# Patient Record
Sex: FEMALE | Race: WHITE | NOT HISPANIC OR LATINO | Employment: OTHER | URBAN - METROPOLITAN AREA
[De-identification: names, ages, dates, MRNs, and addresses within clinical notes are randomized per-mention and may not be internally consistent; named-entity substitution may affect disease eponyms.]

---

## 2017-01-24 ENCOUNTER — CLINIC PROCEDURE ONLY (OUTPATIENT)
Dept: URBAN - METROPOLITAN AREA CLINIC 26 | Facility: CLINIC | Age: 77
End: 2017-01-24

## 2017-01-24 DIAGNOSIS — H35.3211: ICD-10-CM

## 2017-01-24 PROCEDURE — 67028 INJECTION EYE DRUG: CPT

## 2017-01-24 ASSESSMENT — VISUAL ACUITY: OD_CC: 20/60

## 2017-01-24 ASSESSMENT — TONOMETRY: OD_IOP_MMHG: 13

## 2017-01-31 ENCOUNTER — IMPORTED ENCOUNTER (OUTPATIENT)
Dept: URBAN - METROPOLITAN AREA CLINIC 31 | Facility: CLINIC | Age: 77
End: 2017-01-31

## 2017-01-31 PROBLEM — H35.3122: Noted: 2017-01-31

## 2017-01-31 PROBLEM — H35.3211: Noted: 2017-01-31

## 2017-01-31 PROBLEM — H25.13: Noted: 2017-01-31

## 2017-01-31 PROBLEM — H35.3212: Noted: 2017-01-31

## 2017-01-31 PROBLEM — H40.1132: Noted: 2017-01-31

## 2017-01-31 PROCEDURE — 92310 CONTACT LENS FITTING OU: CPT

## 2017-01-31 PROCEDURE — 92133 CPTRZD OPH DX IMG PST SGM ON: CPT

## 2017-01-31 PROCEDURE — 92014 COMPRE OPH EXAM EST PT 1/>: CPT

## 2017-02-16 ENCOUNTER — IMPORTED ENCOUNTER (OUTPATIENT)
Dept: URBAN - METROPOLITAN AREA CLINIC 31 | Facility: CLINIC | Age: 77
End: 2017-02-16

## 2017-03-07 ENCOUNTER — FOLLOW UP (OUTPATIENT)
Dept: URBAN - METROPOLITAN AREA CLINIC 26 | Facility: CLINIC | Age: 77
End: 2017-03-07

## 2017-03-07 VITALS
WEIGHT: 162 LBS | BODY MASS INDEX: 28.7 KG/M2 | HEART RATE: 52 BPM | SYSTOLIC BLOOD PRESSURE: 108 MMHG | HEIGHT: 63 IN | DIASTOLIC BLOOD PRESSURE: 80 MMHG

## 2017-03-07 DIAGNOSIS — H01.003: ICD-10-CM

## 2017-03-07 DIAGNOSIS — H01.006: ICD-10-CM

## 2017-03-07 DIAGNOSIS — H02.833: ICD-10-CM

## 2017-03-07 DIAGNOSIS — H40.1210: ICD-10-CM

## 2017-03-07 DIAGNOSIS — H02.836: ICD-10-CM

## 2017-03-07 DIAGNOSIS — H35.3122: ICD-10-CM

## 2017-03-07 DIAGNOSIS — H47.391: ICD-10-CM

## 2017-03-07 DIAGNOSIS — H43.393: ICD-10-CM

## 2017-03-07 DIAGNOSIS — H35.3211: ICD-10-CM

## 2017-03-07 DIAGNOSIS — H25.13: ICD-10-CM

## 2017-03-07 DIAGNOSIS — H35.61: ICD-10-CM

## 2017-03-07 DIAGNOSIS — H04.123: ICD-10-CM

## 2017-03-07 PROCEDURE — G8417 CALC BMI ABV UP PARAM F/U: HCPCS

## 2017-03-07 PROCEDURE — 92014 COMPRE OPH EXAM EST PT 1/>: CPT

## 2017-03-07 PROCEDURE — 4177F TALK PT/CRGVR RE AREDS PREV: CPT

## 2017-03-07 PROCEDURE — 2027F OPTIC NERVE HEAD EVAL DONE: CPT

## 2017-03-07 PROCEDURE — 92250 FUNDUS PHOTOGRAPHY W/I&R: CPT

## 2017-03-07 PROCEDURE — 2019F DILATED MACUL EXAM DONE: CPT

## 2017-03-07 PROCEDURE — 67028 INJECTION EYE DRUG: CPT

## 2017-03-07 PROCEDURE — 1036F TOBACCO NON-USER: CPT

## 2017-03-07 ASSESSMENT — TONOMETRY
OS_IOP_MMHG: 11
OD_IOP_MMHG: 14

## 2017-03-07 ASSESSMENT — VISUAL ACUITY
OD_CC: 20/50-2
OS_CC: 20/30+1

## 2017-04-11 ENCOUNTER — CLINIC PROCEDURE ONLY (OUTPATIENT)
Dept: URBAN - METROPOLITAN AREA CLINIC 26 | Facility: CLINIC | Age: 77
End: 2017-04-11

## 2017-04-11 DIAGNOSIS — H35.3211: ICD-10-CM

## 2017-04-11 PROCEDURE — 67028 INJECTION EYE DRUG: CPT

## 2017-04-11 ASSESSMENT — TONOMETRY
OD_IOP_MMHG: 12
OS_IOP_MMHG: 11

## 2017-04-11 ASSESSMENT — VISUAL ACUITY
OS_CC: 20/30-2
OD_CC: 20/50-1

## 2017-05-16 ENCOUNTER — CLINIC PROCEDURE ONLY (OUTPATIENT)
Dept: URBAN - METROPOLITAN AREA CLINIC 26 | Facility: CLINIC | Age: 77
End: 2017-05-16

## 2017-05-16 DIAGNOSIS — H35.3211: ICD-10-CM

## 2017-05-16 PROCEDURE — 67028 INJECTION EYE DRUG: CPT

## 2017-05-16 ASSESSMENT — TONOMETRY: OD_IOP_MMHG: 11

## 2017-05-16 ASSESSMENT — VISUAL ACUITY: OD_CC: 20/60-2

## 2017-06-20 ENCOUNTER — FOLLOW UP AND POST INJECTION EVALUATION (OUTPATIENT)
Dept: URBAN - METROPOLITAN AREA CLINIC 26 | Facility: CLINIC | Age: 77
End: 2017-06-20

## 2017-06-20 VITALS — HEIGHT: 55 IN | SYSTOLIC BLOOD PRESSURE: 138 MMHG | DIASTOLIC BLOOD PRESSURE: 86 MMHG | HEART RATE: 60 BPM

## 2017-06-20 DIAGNOSIS — H47.391: ICD-10-CM

## 2017-06-20 DIAGNOSIS — H35.3211: ICD-10-CM

## 2017-06-20 DIAGNOSIS — H40.1210: ICD-10-CM

## 2017-06-20 DIAGNOSIS — H43.393: ICD-10-CM

## 2017-06-20 DIAGNOSIS — H35.3122: ICD-10-CM

## 2017-06-20 DIAGNOSIS — H35.61: ICD-10-CM

## 2017-06-20 PROCEDURE — 2019F DILATED MACUL EXAM DONE: CPT

## 2017-06-20 PROCEDURE — 67028 INJECTION EYE DRUG: CPT

## 2017-06-20 PROCEDURE — 92250 FUNDUS PHOTOGRAPHY W/I&R: CPT

## 2017-06-20 PROCEDURE — 2027F OPTIC NERVE HEAD EVAL DONE: CPT

## 2017-06-20 PROCEDURE — G8427 DOCREV CUR MEDS BY ELIG CLIN: HCPCS

## 2017-06-20 PROCEDURE — 1036F TOBACCO NON-USER: CPT

## 2017-06-20 PROCEDURE — 92134 CPTRZ OPH DX IMG PST SGM RTA: CPT

## 2017-06-20 PROCEDURE — 4177F TALK PT/CRGVR RE AREDS PREV: CPT

## 2017-06-20 PROCEDURE — 92014 COMPRE OPH EXAM EST PT 1/>: CPT

## 2017-06-20 ASSESSMENT — VISUAL ACUITY
OS_CC: 20/25+1
OD_CC: 20/40+1

## 2017-06-20 ASSESSMENT — TONOMETRY
OD_IOP_MMHG: 11
OS_IOP_MMHG: 13

## 2017-07-25 ENCOUNTER — CLINIC PROCEDURE ONLY (OUTPATIENT)
Dept: URBAN - METROPOLITAN AREA CLINIC 26 | Facility: CLINIC | Age: 77
End: 2017-07-25

## 2017-07-25 DIAGNOSIS — H35.3211: ICD-10-CM

## 2017-07-25 PROCEDURE — 67028 INJECTION EYE DRUG: CPT

## 2017-07-25 ASSESSMENT — VISUAL ACUITY: OD_SC: 20/40-

## 2017-07-25 ASSESSMENT — TONOMETRY: OD_IOP_MMHG: 13

## 2017-09-05 ENCOUNTER — CLINIC PROCEDURE ONLY (OUTPATIENT)
Dept: URBAN - METROPOLITAN AREA CLINIC 26 | Facility: CLINIC | Age: 77
End: 2017-09-05

## 2017-09-05 DIAGNOSIS — H35.3211: ICD-10-CM

## 2017-09-05 PROCEDURE — 67028 INJECTION EYE DRUG: CPT

## 2017-09-05 ASSESSMENT — TONOMETRY: OD_IOP_MMHG: 11

## 2017-09-05 ASSESSMENT — VISUAL ACUITY: OD_CC: 20/40-

## 2017-10-11 ENCOUNTER — FOLLOW UP AND POST INJECTION EVALUATION (OUTPATIENT)
Dept: URBAN - METROPOLITAN AREA CLINIC 26 | Facility: CLINIC | Age: 77
End: 2017-10-11

## 2017-10-11 VITALS — HEART RATE: 53 BPM | DIASTOLIC BLOOD PRESSURE: 84 MMHG | HEIGHT: 55 IN | SYSTOLIC BLOOD PRESSURE: 128 MMHG

## 2017-10-11 DIAGNOSIS — H40.1210: ICD-10-CM

## 2017-10-11 DIAGNOSIS — H40.033: ICD-10-CM

## 2017-10-11 DIAGNOSIS — H43.393: ICD-10-CM

## 2017-10-11 DIAGNOSIS — H35.61: ICD-10-CM

## 2017-10-11 DIAGNOSIS — H47.391: ICD-10-CM

## 2017-10-11 DIAGNOSIS — H35.3211: ICD-10-CM

## 2017-10-11 DIAGNOSIS — H35.3122: ICD-10-CM

## 2017-10-11 PROCEDURE — 2019F DILATED MACUL EXAM DONE: CPT

## 2017-10-11 PROCEDURE — 4177F TALK PT/CRGVR RE AREDS PREV: CPT

## 2017-10-11 PROCEDURE — G8427 DOCREV CUR MEDS BY ELIG CLIN: HCPCS

## 2017-10-11 PROCEDURE — 67028 INJECTION EYE DRUG: CPT

## 2017-10-11 PROCEDURE — 92134 CPTRZ OPH DX IMG PST SGM RTA: CPT

## 2017-10-11 PROCEDURE — 1036F TOBACCO NON-USER: CPT

## 2017-10-11 PROCEDURE — 92012 INTRM OPH EXAM EST PATIENT: CPT

## 2017-10-11 ASSESSMENT — VISUAL ACUITY
OD_CC: 20/40
OS_CC: 20/25

## 2017-10-11 ASSESSMENT — TONOMETRY
OS_IOP_MMHG: 12
OD_IOP_MMHG: 12

## 2017-11-15 ENCOUNTER — FOLLOW UP (OUTPATIENT)
Dept: URBAN - METROPOLITAN AREA CLINIC 26 | Facility: CLINIC | Age: 77
End: 2017-11-15

## 2017-11-15 VITALS — SYSTOLIC BLOOD PRESSURE: 120 MMHG | HEART RATE: 68 BPM | HEIGHT: 55 IN | DIASTOLIC BLOOD PRESSURE: 82 MMHG

## 2017-11-15 DIAGNOSIS — H47.391: ICD-10-CM

## 2017-11-15 DIAGNOSIS — H43.393: ICD-10-CM

## 2017-11-15 DIAGNOSIS — H35.3122: ICD-10-CM

## 2017-11-15 DIAGNOSIS — H40.033: ICD-10-CM

## 2017-11-15 DIAGNOSIS — H35.3211: ICD-10-CM

## 2017-11-15 DIAGNOSIS — H40.1210: ICD-10-CM

## 2017-11-15 DIAGNOSIS — H35.61: ICD-10-CM

## 2017-11-15 PROCEDURE — 1036F TOBACCO NON-USER: CPT

## 2017-11-15 PROCEDURE — 4177F TALK PT/CRGVR RE AREDS PREV: CPT

## 2017-11-15 PROCEDURE — 92014 COMPRE OPH EXAM EST PT 1/>: CPT

## 2017-11-15 PROCEDURE — 67028 INJECTION EYE DRUG: CPT

## 2017-11-15 PROCEDURE — 2019F DILATED MACUL EXAM DONE: CPT

## 2017-11-15 PROCEDURE — G8427 DOCREV CUR MEDS BY ELIG CLIN: HCPCS

## 2017-11-15 PROCEDURE — 92250 FUNDUS PHOTOGRAPHY W/I&R: CPT

## 2017-11-15 ASSESSMENT — VISUAL ACUITY
OD_CC: 20/25-2
OS_CC: 20/30

## 2017-11-15 ASSESSMENT — TONOMETRY
OD_IOP_MMHG: 14
OS_IOP_MMHG: 15

## 2017-12-20 ENCOUNTER — CLINIC PROCEDURE ONLY (OUTPATIENT)
Dept: URBAN - METROPOLITAN AREA CLINIC 26 | Facility: CLINIC | Age: 77
End: 2017-12-20

## 2017-12-20 DIAGNOSIS — H35.3211: ICD-10-CM

## 2017-12-20 PROCEDURE — 67028 INJECTION EYE DRUG: CPT

## 2017-12-20 ASSESSMENT — VISUAL ACUITY: OD_CC: 20/40

## 2017-12-20 ASSESSMENT — TONOMETRY: OD_IOP_MMHG: 11

## 2018-01-24 ENCOUNTER — CLINIC PROCEDURE ONLY (OUTPATIENT)
Dept: URBAN - METROPOLITAN AREA CLINIC 26 | Facility: CLINIC | Age: 78
End: 2018-01-24

## 2018-01-24 DIAGNOSIS — H35.3211: ICD-10-CM

## 2018-01-24 PROCEDURE — 67028 INJECTION EYE DRUG: CPT

## 2018-01-24 ASSESSMENT — TONOMETRY: OD_IOP_MMHG: 11

## 2018-01-24 ASSESSMENT — VISUAL ACUITY: OD_CC: 20/40-2

## 2018-03-05 ENCOUNTER — CLINIC PROCEDURE ONLY (OUTPATIENT)
Dept: URBAN - METROPOLITAN AREA CLINIC 26 | Facility: CLINIC | Age: 78
End: 2018-03-05

## 2018-03-05 DIAGNOSIS — H35.3211: ICD-10-CM

## 2018-03-05 PROCEDURE — 67028 INJECTION EYE DRUG: CPT

## 2018-03-05 ASSESSMENT — TONOMETRY: OD_IOP_MMHG: 06

## 2018-03-05 ASSESSMENT — VISUAL ACUITY: OD_CC: 20/40-2

## 2018-04-09 ENCOUNTER — CLINIC PROCEDURE ONLY (OUTPATIENT)
Dept: URBAN - METROPOLITAN AREA CLINIC 26 | Facility: CLINIC | Age: 78
End: 2018-04-09

## 2018-04-09 DIAGNOSIS — H35.3211: ICD-10-CM

## 2018-04-09 PROCEDURE — 67028 INJECTION EYE DRUG: CPT

## 2018-04-09 ASSESSMENT — TONOMETRY: OD_IOP_MMHG: 05

## 2018-04-09 ASSESSMENT — VISUAL ACUITY
OD_PH: 20/40-2
OD_SC: 20/100-1

## 2018-05-14 ENCOUNTER — FOLLOW UP AND POST INJECTION EVALUATION (OUTPATIENT)
Dept: URBAN - METROPOLITAN AREA CLINIC 26 | Facility: CLINIC | Age: 78
End: 2018-05-14

## 2018-05-14 VITALS — DIASTOLIC BLOOD PRESSURE: 75 MMHG | HEIGHT: 55 IN | SYSTOLIC BLOOD PRESSURE: 114 MMHG | HEART RATE: 54 BPM

## 2018-05-14 DIAGNOSIS — H47.391: ICD-10-CM

## 2018-05-14 DIAGNOSIS — H35.61: ICD-10-CM

## 2018-05-14 DIAGNOSIS — H40.033: ICD-10-CM

## 2018-05-14 DIAGNOSIS — H35.3122: ICD-10-CM

## 2018-05-14 DIAGNOSIS — H40.1210: ICD-10-CM

## 2018-05-14 DIAGNOSIS — H25.13: ICD-10-CM

## 2018-05-14 DIAGNOSIS — H43.393: ICD-10-CM

## 2018-05-14 DIAGNOSIS — H35.3211: ICD-10-CM

## 2018-05-14 PROCEDURE — 92014 COMPRE OPH EXAM EST PT 1/>: CPT

## 2018-05-14 PROCEDURE — 92235 FLUORESCEIN ANGRPH MLTIFRAME: CPT

## 2018-05-14 PROCEDURE — 67028 INJECTION EYE DRUG: CPT

## 2018-05-14 PROCEDURE — 92250 FUNDUS PHOTOGRAPHY W/I&R: CPT

## 2018-05-14 PROCEDURE — 92134 CPTRZ OPH DX IMG PST SGM RTA: CPT

## 2018-05-14 ASSESSMENT — VISUAL ACUITY
OD_CC: 20/40-1
OS_CC: 20/30-1

## 2018-05-14 ASSESSMENT — TONOMETRY
OS_IOP_MMHG: 13
OD_IOP_MMHG: 13

## 2018-06-18 ENCOUNTER — CLINICAL PROCEDURE AND DIAGNOSTIC TESTING ONLY (OUTPATIENT)
Dept: URBAN - METROPOLITAN AREA CLINIC 26 | Facility: CLINIC | Age: 78
End: 2018-06-18

## 2018-06-18 DIAGNOSIS — H35.3122: ICD-10-CM

## 2018-06-18 DIAGNOSIS — H35.3211: ICD-10-CM

## 2018-06-18 PROCEDURE — 67028 INJECTION EYE DRUG: CPT

## 2018-06-18 PROCEDURE — 92134 CPTRZ OPH DX IMG PST SGM RTA: CPT

## 2018-06-18 ASSESSMENT — VISUAL ACUITY: OD_CC: 20/40-

## 2018-06-18 ASSESSMENT — TONOMETRY: OD_IOP_MMHG: 11

## 2018-07-23 ENCOUNTER — CLINICAL PROCEDURE AND DIAGNOSTIC TESTING ONLY (OUTPATIENT)
Dept: URBAN - METROPOLITAN AREA CLINIC 26 | Facility: CLINIC | Age: 78
End: 2018-07-23

## 2018-07-23 DIAGNOSIS — H35.3211: ICD-10-CM

## 2018-07-23 DIAGNOSIS — H40.033: ICD-10-CM

## 2018-07-23 PROCEDURE — 92250 FUNDUS PHOTOGRAPHY W/I&R: CPT

## 2018-07-23 PROCEDURE — 67028 INJECTION EYE DRUG: CPT

## 2018-07-23 ASSESSMENT — VISUAL ACUITY: OD_CC: 20/50+2

## 2018-07-23 ASSESSMENT — TONOMETRY: OD_IOP_MMHG: 8

## 2018-08-27 ENCOUNTER — CLINICAL PROCEDURE AND DIAGNOSTIC TESTING ONLY (OUTPATIENT)
Dept: URBAN - METROPOLITAN AREA CLINIC 26 | Facility: CLINIC | Age: 78
End: 2018-08-27

## 2018-08-27 DIAGNOSIS — H35.3211: ICD-10-CM

## 2018-08-27 DIAGNOSIS — H35.3122: ICD-10-CM

## 2018-08-27 PROCEDURE — 67028 INJECTION EYE DRUG: CPT

## 2018-08-27 PROCEDURE — 92134 CPTRZ OPH DX IMG PST SGM RTA: CPT

## 2018-08-27 ASSESSMENT — VISUAL ACUITY: OD_CC: 20/50

## 2018-08-27 ASSESSMENT — TONOMETRY: OD_IOP_MMHG: 09

## 2018-10-01 ENCOUNTER — CLINICAL PROCEDURE AND DIAGNOSTIC TESTING ONLY (OUTPATIENT)
Dept: URBAN - METROPOLITAN AREA CLINIC 26 | Facility: CLINIC | Age: 78
End: 2018-10-01

## 2018-10-01 DIAGNOSIS — H35.3211: ICD-10-CM

## 2018-10-01 DIAGNOSIS — H40.033: ICD-10-CM

## 2018-10-01 PROCEDURE — 92250 FUNDUS PHOTOGRAPHY W/I&R: CPT

## 2018-10-01 PROCEDURE — 67028 INJECTION EYE DRUG: CPT

## 2018-10-01 ASSESSMENT — VISUAL ACUITY: OD_CC: 20/40-2

## 2018-10-01 ASSESSMENT — TONOMETRY: OD_IOP_MMHG: 8

## 2018-11-06 ENCOUNTER — FOLLOW UP AND POST INJECTION EVALUATION (OUTPATIENT)
Dept: URBAN - METROPOLITAN AREA CLINIC 26 | Facility: CLINIC | Age: 78
End: 2018-11-06

## 2018-11-06 VITALS — DIASTOLIC BLOOD PRESSURE: 68 MMHG | HEART RATE: 59 BPM | HEIGHT: 55 IN | SYSTOLIC BLOOD PRESSURE: 124 MMHG

## 2018-11-06 DIAGNOSIS — H40.1210: ICD-10-CM

## 2018-11-06 DIAGNOSIS — H35.3122: ICD-10-CM

## 2018-11-06 DIAGNOSIS — H35.61: ICD-10-CM

## 2018-11-06 DIAGNOSIS — H47.391: ICD-10-CM

## 2018-11-06 DIAGNOSIS — H43.393: ICD-10-CM

## 2018-11-06 DIAGNOSIS — H35.3211: ICD-10-CM

## 2018-11-06 DIAGNOSIS — H40.033: ICD-10-CM

## 2018-11-06 PROCEDURE — 67028 INJECTION EYE DRUG: CPT

## 2018-11-06 PROCEDURE — 92250 FUNDUS PHOTOGRAPHY W/I&R: CPT

## 2018-11-06 PROCEDURE — 92134 CPTRZ OPH DX IMG PST SGM RTA: CPT

## 2018-11-06 PROCEDURE — 92014 COMPRE OPH EXAM EST PT 1/>: CPT

## 2018-11-06 PROCEDURE — 92235 FLUORESCEIN ANGRPH MLTIFRAME: CPT

## 2018-11-06 ASSESSMENT — TONOMETRY
OS_IOP_MMHG: 10
OD_IOP_MMHG: 10

## 2018-11-06 ASSESSMENT — VISUAL ACUITY
OD_CC: 20/40+1
OS_CC: 20/40+2

## 2018-12-11 ENCOUNTER — CLINICAL PROCEDURE AND DIAGNOSTIC TESTING ONLY (OUTPATIENT)
Dept: URBAN - METROPOLITAN AREA CLINIC 26 | Facility: CLINIC | Age: 78
End: 2018-12-11

## 2018-12-11 DIAGNOSIS — H40.033: ICD-10-CM

## 2018-12-11 DIAGNOSIS — H35.3211: ICD-10-CM

## 2018-12-11 PROCEDURE — 67028 INJECTION EYE DRUG: CPT

## 2018-12-11 PROCEDURE — 92250 FUNDUS PHOTOGRAPHY W/I&R: CPT

## 2018-12-11 ASSESSMENT — VISUAL ACUITY: OD_CC: 20/60-1

## 2018-12-11 ASSESSMENT — TONOMETRY: OD_IOP_MMHG: 13

## 2019-01-15 ENCOUNTER — CLINICAL PROCEDURE AND DIAGNOSTIC TESTING ONLY (OUTPATIENT)
Dept: URBAN - METROPOLITAN AREA CLINIC 26 | Facility: CLINIC | Age: 79
End: 2019-01-15

## 2019-01-15 DIAGNOSIS — H35.3211: ICD-10-CM

## 2019-01-15 DIAGNOSIS — H35.3122: ICD-10-CM

## 2019-01-15 PROCEDURE — 67028 INJECTION EYE DRUG: CPT

## 2019-01-15 PROCEDURE — 92134 CPTRZ OPH DX IMG PST SGM RTA: CPT

## 2019-01-15 ASSESSMENT — VISUAL ACUITY: OD_CC: 20/40-2

## 2019-01-15 ASSESSMENT — TONOMETRY: OD_IOP_MMHG: 10

## 2019-02-14 ENCOUNTER — CLINICAL PROCEDURE AND DIAGNOSTIC TESTING ONLY (OUTPATIENT)
Dept: URBAN - METROPOLITAN AREA CLINIC 26 | Facility: CLINIC | Age: 79
End: 2019-02-14

## 2019-02-14 DIAGNOSIS — H40.033: ICD-10-CM

## 2019-02-14 DIAGNOSIS — H35.3211: ICD-10-CM

## 2019-02-14 PROCEDURE — 67028 INJECTION EYE DRUG: CPT

## 2019-02-14 PROCEDURE — 92250 FUNDUS PHOTOGRAPHY W/I&R: CPT

## 2019-02-14 ASSESSMENT — VISUAL ACUITY: OD_SC: 20/60-2

## 2019-02-14 ASSESSMENT — TONOMETRY: OD_IOP_MMHG: 4

## 2019-03-21 ENCOUNTER — FOLLOW UP AND POST INJECTION EVALUATION (OUTPATIENT)
Dept: URBAN - METROPOLITAN AREA CLINIC 26 | Facility: CLINIC | Age: 79
End: 2019-03-21

## 2019-03-21 VITALS — WEIGHT: 170 LBS | HEIGHT: 62.99 IN | BODY MASS INDEX: 30.12 KG/M2

## 2019-03-21 DIAGNOSIS — H35.3211: ICD-10-CM

## 2019-03-21 DIAGNOSIS — H35.3122: ICD-10-CM

## 2019-03-21 DIAGNOSIS — H40.033: ICD-10-CM

## 2019-03-21 DIAGNOSIS — H47.391: ICD-10-CM

## 2019-03-21 DIAGNOSIS — H35.61: ICD-10-CM

## 2019-03-21 DIAGNOSIS — H43.393: ICD-10-CM

## 2019-03-21 DIAGNOSIS — H40.1210: ICD-10-CM

## 2019-03-21 PROCEDURE — 92250 FUNDUS PHOTOGRAPHY W/I&R: CPT

## 2019-03-21 PROCEDURE — 92014 COMPRE OPH EXAM EST PT 1/>: CPT

## 2019-03-21 PROCEDURE — 67028 INJECTION EYE DRUG: CPT

## 2019-03-21 PROCEDURE — 92134 CPTRZ OPH DX IMG PST SGM RTA: CPT

## 2019-03-21 ASSESSMENT — TONOMETRY
OS_IOP_MMHG: 4
OD_IOP_MMHG: 8

## 2019-03-21 ASSESSMENT — VISUAL ACUITY
OS_CC: 20/30-2
OD_CC: 20/60+2

## 2020-03-10 NOTE — PATIENT DISCUSSION
MODERATE DRY EYES: PRESCRIBED DISAPPEARING OTC PRESERVATIVE OR ARTIFICIAL TEARS  UP TO BID-QID OU AND THE DAILY INTAKE OF OMEGA-3 DHA/EPA FATTY ACIDS TO HELP RELIEVE SYMPTOMS. ADD NIGHTLY LUBRICATING OINTMENT OR GEL. CONTINUE RESTASIS BID OU. CONSIDER PUNCTAL PLUGS AND/OR LIPIFLOW TREATMENT NEXT VISIT IF NOT RESPONSIVE OR IF SYMPTOMS PERSIST.  RETURN FOR FOLLOW-UP AS SCHEDULED OR SOONER IF SYMPTOMS WORSEN

## 2020-03-11 NOTE — PATIENT DISCUSSION
POSTERIOR VITREOUS DETACHMENT OD: SCLERAL DEPRESSION PERFORMED TODAY - NO HOLES, TEARS, OR RETINAL DETACHMENTS TODAY. ADVISED PT TO CALL IF ANY FLASHES OF LIGHT, INCREASE IN FLOATERS, OR DECREASE VISION IN EITHER EYE.

## 2020-03-11 NOTE — PATIENT DISCUSSION
CATARACTS OU:  NOT MEDICALLY INDICATED AT THIS TIME. CONTINUE TO MONITOR. ADVISED PT WE CAN SEND FOR REFRACTION WITH GENERAL OPHTHALMOLOGY IF THEY FEEL GLASSES RX NEEDS TO BE UPDATED.

## 2020-03-11 NOTE — PATIENT DISCUSSION
AMD (DRY), OD:  PRESCRIBE AREDS 2 VITAMINS / AMSLER GRID QD/ UV PROTECTION. SMOKING CESSATION EMPHASIZED. RETURN FOR FOLLOW-UP AS SCHEDULED.

## 2020-12-01 ENCOUNTER — OFFICE VISIT (OUTPATIENT)
Age: 80
End: 2020-12-01

## 2020-12-16 ENCOUNTER — OFFICE VISIT (OUTPATIENT)
Dept: URBAN - METROPOLITAN AREA CLINIC 9 | Facility: CLINIC | Age: 80
End: 2020-12-16

## 2021-01-04 ENCOUNTER — OFFICE VISIT (OUTPATIENT)
Dept: URBAN - METROPOLITAN AREA SURGERY CENTER 9 | Facility: SURGERY CENTER | Age: 81
End: 2021-01-04

## 2021-01-04 ENCOUNTER — TELEPHONE ENCOUNTER (OUTPATIENT)
Dept: URBAN - METROPOLITAN AREA CLINIC 9 | Facility: CLINIC | Age: 81
End: 2021-01-04

## 2021-01-19 ENCOUNTER — OFFICE VISIT (OUTPATIENT)
Dept: URBAN - METROPOLITAN AREA SURGERY CENTER 9 | Facility: SURGERY CENTER | Age: 81
End: 2021-01-19

## 2021-01-20 ENCOUNTER — TELEPHONE ENCOUNTER (OUTPATIENT)
Dept: URBAN - METROPOLITAN AREA CLINIC 9 | Facility: CLINIC | Age: 81
End: 2021-01-20

## 2021-01-26 ENCOUNTER — OFFICE VISIT (OUTPATIENT)
Dept: URBAN - METROPOLITAN AREA SURGERY CENTER 9 | Facility: SURGERY CENTER | Age: 81
End: 2021-01-26

## 2021-03-25 ENCOUNTER — FOLLOW UP (OUTPATIENT)
Dept: URBAN - METROPOLITAN AREA CLINIC 26 | Facility: CLINIC | Age: 81
End: 2021-03-25

## 2021-03-25 VITALS
SYSTOLIC BLOOD PRESSURE: 125 MMHG | HEIGHT: 63 IN | BODY MASS INDEX: 28.7 KG/M2 | DIASTOLIC BLOOD PRESSURE: 81 MMHG | WEIGHT: 162 LBS | HEART RATE: 60 BPM

## 2021-03-25 DIAGNOSIS — H35.61: ICD-10-CM

## 2021-03-25 DIAGNOSIS — H43.393: ICD-10-CM

## 2021-03-25 DIAGNOSIS — H40.1210: ICD-10-CM

## 2021-03-25 DIAGNOSIS — H35.3211: ICD-10-CM

## 2021-03-25 DIAGNOSIS — H40.033: ICD-10-CM

## 2021-03-25 DIAGNOSIS — H47.391: ICD-10-CM

## 2021-03-25 DIAGNOSIS — H35.3122: ICD-10-CM

## 2021-03-25 PROCEDURE — 92014 COMPRE OPH EXAM EST PT 1/>: CPT

## 2021-03-25 PROCEDURE — 92250 FUNDUS PHOTOGRAPHY W/I&R: CPT

## 2021-03-25 PROCEDURE — 92134 CPTRZ OPH DX IMG PST SGM RTA: CPT

## 2021-03-25 PROCEDURE — 92235 FLUORESCEIN ANGRPH MLTIFRAME: CPT

## 2021-03-25 ASSESSMENT — TONOMETRY
OS_IOP_MMHG: 11
OD_IOP_MMHG: 14

## 2021-03-25 ASSESSMENT — VISUAL ACUITY
OS_SC: 20/60+2
OD_SC: 20/200-1
OS_PH: 20/40-1

## 2021-05-04 ENCOUNTER — OFFICE VISIT (OUTPATIENT)
Age: 81
End: 2021-05-04

## 2021-05-04 ENCOUNTER — TELEPHONE ENCOUNTER (OUTPATIENT)
Dept: URBAN - METROPOLITAN AREA CLINIC 9 | Facility: CLINIC | Age: 81
End: 2021-05-04

## 2021-05-11 ENCOUNTER — OFFICE VISIT (OUTPATIENT)
Dept: URBAN - METROPOLITAN AREA CLINIC 9 | Facility: CLINIC | Age: 81
End: 2021-05-11

## 2021-05-14 ENCOUNTER — TELEPHONE ENCOUNTER (OUTPATIENT)
Dept: URBAN - METROPOLITAN AREA CLINIC 9 | Facility: CLINIC | Age: 81
End: 2021-05-14

## 2021-05-19 ENCOUNTER — OFFICE VISIT (OUTPATIENT)
Dept: URBAN - METROPOLITAN AREA SURGERY CENTER 9 | Facility: SURGERY CENTER | Age: 81
End: 2021-05-19

## 2021-05-19 ENCOUNTER — TELEPHONE ENCOUNTER (OUTPATIENT)
Dept: URBAN - METROPOLITAN AREA CLINIC 9 | Facility: CLINIC | Age: 81
End: 2021-05-19

## 2022-03-04 ENCOUNTER — FOLLOW UP (OUTPATIENT)
Dept: URBAN - METROPOLITAN AREA CLINIC 26 | Facility: CLINIC | Age: 82
End: 2022-03-04

## 2022-03-04 VITALS
HEIGHT: 63 IN | WEIGHT: 160 LBS | DIASTOLIC BLOOD PRESSURE: 87 MMHG | SYSTOLIC BLOOD PRESSURE: 130 MMHG | BODY MASS INDEX: 28.35 KG/M2 | HEART RATE: 53 BPM

## 2022-03-04 DIAGNOSIS — H40.033: ICD-10-CM

## 2022-03-04 DIAGNOSIS — H04.123: ICD-10-CM

## 2022-03-04 DIAGNOSIS — H35.61: ICD-10-CM

## 2022-03-04 DIAGNOSIS — H40.1210: ICD-10-CM

## 2022-03-04 DIAGNOSIS — H35.3211: ICD-10-CM

## 2022-03-04 DIAGNOSIS — H43.393: ICD-10-CM

## 2022-03-04 DIAGNOSIS — H35.3122: ICD-10-CM

## 2022-03-04 DIAGNOSIS — H25.13: ICD-10-CM

## 2022-03-04 DIAGNOSIS — H47.391: ICD-10-CM

## 2022-03-04 PROCEDURE — 92014 COMPRE OPH EXAM EST PT 1/>: CPT

## 2022-03-04 PROCEDURE — 92134 CPTRZ OPH DX IMG PST SGM RTA: CPT

## 2022-03-04 PROCEDURE — 92250 FUNDUS PHOTOGRAPHY W/I&R: CPT

## 2022-03-04 ASSESSMENT — VISUAL ACUITY
OS_SC: 20/60-2
OD_SC: CF 3FT

## 2022-03-04 ASSESSMENT — TONOMETRY
OD_IOP_MMHG: 13
OS_IOP_MMHG: 14

## 2022-04-02 ASSESSMENT — VISUAL ACUITY
OS_SC: 20/60
OD_SC: 20/200

## 2022-04-02 ASSESSMENT — TONOMETRY
OS_IOP_MMHG: 12
OD_IOP_MMHG: 12

## 2022-07-09 ENCOUNTER — TELEPHONE ENCOUNTER (OUTPATIENT)
Dept: URBAN - METROPOLITAN AREA CLINIC 121 | Facility: CLINIC | Age: 82
End: 2022-07-09

## 2022-07-10 ENCOUNTER — TELEPHONE ENCOUNTER (OUTPATIENT)
Dept: URBAN - METROPOLITAN AREA CLINIC 121 | Facility: CLINIC | Age: 82
End: 2022-07-10

## 2022-07-30 ENCOUNTER — TELEPHONE ENCOUNTER (OUTPATIENT)
Age: 82
End: 2022-07-30

## 2022-07-30 RX ORDER — SODIUM SULFATE, POTASSIUM SULFATE, MAGNESIUM SULFATE 17.5; 3.13; 1.6 G/ML; G/ML; G/ML
1 (ONE) SOLUTION, CONCENTRATE ORAL
Qty: 0 | Refills: 2 | OUTPATIENT
Start: 2021-05-04 | End: 2021-05-05

## 2022-07-30 RX ORDER — SODIUM SULFATE, POTASSIUM SULFATE, MAGNESIUM SULFATE 17.5; 3.13; 1.6 G/ML; G/ML; G/ML
1 (ONE) SOLUTION, CONCENTRATE ORAL
Qty: 0 | Refills: 2 | OUTPATIENT
Start: 2020-12-16 | End: 2020-12-17

## 2022-07-31 ENCOUNTER — TELEPHONE ENCOUNTER (OUTPATIENT)
Age: 82
End: 2022-07-31

## 2022-07-31 RX ORDER — SODIUM SULFATE, POTASSIUM SULFATE, MAGNESIUM SULFATE 17.5; 3.13; 1.6 G/ML; G/ML; G/ML
1 (ONE) SOLUTION, CONCENTRATE ORAL
Qty: 0 | Refills: 2 | Status: ACTIVE | COMMUNITY
Start: 2021-05-04

## 2022-07-31 RX ORDER — SODIUM SULFATE, POTASSIUM SULFATE, MAGNESIUM SULFATE 17.5; 3.13; 1.6 G/ML; G/ML; G/ML
1 (ONE) SOLUTION, CONCENTRATE ORAL
Qty: 0 | Refills: 2 | Status: ACTIVE | COMMUNITY
Start: 2020-12-16

## 2023-07-07 ENCOUNTER — FOLLOW UP (OUTPATIENT)
Dept: URBAN - METROPOLITAN AREA CLINIC 26 | Facility: CLINIC | Age: 83
End: 2023-07-07

## 2023-07-07 VITALS — BODY MASS INDEX: 26.93 KG/M2 | WEIGHT: 152 LBS | HEIGHT: 63 IN

## 2023-07-07 DIAGNOSIS — H35.3231: ICD-10-CM

## 2023-07-07 DIAGNOSIS — H35.3122: ICD-10-CM

## 2023-07-07 DIAGNOSIS — H47.391: ICD-10-CM

## 2023-07-07 DIAGNOSIS — H35.63: ICD-10-CM

## 2023-07-07 DIAGNOSIS — H40.1210: ICD-10-CM

## 2023-07-07 DIAGNOSIS — H25.13: ICD-10-CM

## 2023-07-07 DIAGNOSIS — H01.003: ICD-10-CM

## 2023-07-07 DIAGNOSIS — H01.006: ICD-10-CM

## 2023-07-07 DIAGNOSIS — H54.8: ICD-10-CM

## 2023-07-07 DIAGNOSIS — H02.833: ICD-10-CM

## 2023-07-07 DIAGNOSIS — H02.836: ICD-10-CM

## 2023-07-07 DIAGNOSIS — H43.393: ICD-10-CM

## 2023-07-07 DIAGNOSIS — H04.123: ICD-10-CM

## 2023-07-07 DIAGNOSIS — H40.033: ICD-10-CM

## 2023-07-07 PROCEDURE — 92250 FUNDUS PHOTOGRAPHY W/I&R: CPT

## 2023-07-07 PROCEDURE — 92014 COMPRE OPH EXAM EST PT 1/>: CPT

## 2023-07-07 PROCEDURE — 92134 CPTRZ OPH DX IMG PST SGM RTA: CPT

## 2023-07-07 PROCEDURE — 67028 INJECTION EYE DRUG: CPT

## 2023-07-07 ASSESSMENT — TONOMETRY
OS_IOP_MMHG: 12
OD_IOP_MMHG: 12

## 2023-07-07 ASSESSMENT — VISUAL ACUITY
OS_SC: CF 5FT
OD_SC: CF 3FT

## 2023-08-08 ENCOUNTER — CLINIC PROCEDURE ONLY (OUTPATIENT)
Dept: URBAN - METROPOLITAN AREA CLINIC 26 | Facility: CLINIC | Age: 83
End: 2023-08-08

## 2023-08-08 DIAGNOSIS — H40.033: ICD-10-CM

## 2023-08-08 DIAGNOSIS — H35.3231: ICD-10-CM

## 2023-08-08 PROCEDURE — 92134 CPTRZ OPH DX IMG PST SGM RTA: CPT

## 2023-08-08 PROCEDURE — 67028 INJECTION EYE DRUG: CPT

## 2023-08-08 PROCEDURE — 92250 FUNDUS PHOTOGRAPHY W/I&R: CPT

## 2023-08-08 ASSESSMENT — TONOMETRY: OS_IOP_MMHG: 10

## 2023-09-12 ENCOUNTER — CLINIC PROCEDURE ONLY (OUTPATIENT)
Dept: URBAN - METROPOLITAN AREA CLINIC 26 | Facility: CLINIC | Age: 83
End: 2023-09-12

## 2023-09-12 DIAGNOSIS — H35.3231: ICD-10-CM

## 2023-09-12 DIAGNOSIS — H40.033: ICD-10-CM

## 2023-09-12 PROCEDURE — 67028 INJECTION EYE DRUG: CPT

## 2023-09-12 PROCEDURE — 92134 CPTRZ OPH DX IMG PST SGM RTA: CPT

## 2023-09-12 PROCEDURE — 92250 FUNDUS PHOTOGRAPHY W/I&R: CPT

## 2023-09-12 ASSESSMENT — TONOMETRY: OS_IOP_MMHG: 13
